# Patient Record
(demographics unavailable — no encounter records)

---

## 2024-10-10 NOTE — PROCEDURE
[FreeTextEntry3] : Trigger Point was performed because of pain inflammation Anesthesia: ethyl chloride sprayed topically.: Lidocaine .1% 3 cc Marcaine:.25% 3cc  kenalog 10mg/cc 2cc :Needle size: 25 gauge 1 1/2inch.  Medication was injected in the left Lumbar paraspinal muscles . Patient has tried OTC's including aspirin, Ibuprofen, Aleve etc or prescription NSAIDS, and/or exercises at home and/ or physical therapy without satisfactory response After verbal consent using sterile preparation and technique.The risks, benefits, and alternatives to cortisone injection were explained in full to the patient. Risks outlined include but are not limited to infection, sepsis, bleeding, scarring, skin discoloration, temporary increase in pain, syncopal episode, failure to resolve symptoms, allergic reaction, symptom recurrence, and elevation of blood sugar in diabetics. Patient understood the risks. All questions were answered. After discussion of options, patient requested an injection. Oral informed consent was obtained and sterile prep was done of the injection site. Sterile technique was utilized for the procedure including the preparation of the solutions used for the injection. Patient tolerated the procedure well. Advised to ice the injection site this evening.  Sterile technique used Prep with alcohol locally to site.

## 2024-10-10 NOTE — PHYSICAL EXAM
[de-identified] : PHYSICAL EXAM  Constitutional:  Appears well, no apparent distress Ability to communicate: Normal Respiratory: non-labored breathing Skin: no rash noted Head: normocephalic, atraumatic Neck: no visible thyroid enlargement Eyes: extraocular movements intact Neurologic: alert and oriented x3 Psychiatric: normal mood, affect, and behavior  Lumbar Spine:  Palpation: left lumbar paraspinal spasm and left lumbar paraspinal tenderness to palpation. ROM: Diminished range of motion in all plains.  Patient notes pain with lateral bending to the left. MMT: Motor exam is 5/5 through out bilateral lower extremities. Sensation: Light touch and pain is intact throughout bilateral lower extremities. Reflexes: achilles and patella reflexes are intact and  symmetrical.  No sustained clonus. Special Testing: Positive kemps maneuver on the left side  Assessemnt: Lumbar spondylosis (m47.816) Myalgia (M79.10)  Plan: After discussing various treatment options with the patient including but not limited to oral medications, physical therapy, exercise modalities as well as interventional spinal injections, we have decided with the following plan: The patient is presenting with axial lumbar pain that has not responded to three months of conservative therapy including physical therapy or nsaid therapy. The pain is interfering with activities of daily living and functionality.  There is no radicular pain.  The pain is exacerbated by facet loading.  Positive kemps maneuver which is defined by pain reproduction with extension and rotation of the lumbar spine to the affected side.  The patient has not had a vertebral fusion at the levels of the proposed treatment.  There is no unexplained neurologic deficit.  There is no bleeding tendency, unstable medical condition, or systemic infection.  The injection is being performed to diagnose the facet joint as the source of the individuals pain.  The risks, benefits and alternatives of the proposed procedure were explained in detail with the patient.  The risks outlined include but are not limited to infection, bleeding, post dural puncture headache, nerve injury, a temporary increase in pain, failure to resolve symptoms, allergic reaction, symptom recurrence, and possible elevation of blood sugar.  All questions were answered to patient's satisfaction and he/she verbalized an understanding.  Follow up 1-2 weeks post injection foe re-evaluation.  Continue home exercises, stretching, activity modification, physical therapy, and conservative care.

## 2024-10-10 NOTE — HISTORY OF PRESENT ILLNESS
[Lower back] : lower back [Dull/Aching] : dull/aching [Radiating] : radiating [Intermittent] : intermittent [Household chores] : household chores [Leisure] : leisure [Work] : work [Social interactions] : social interactions [Ice] : ice [Injection therapy] : injection therapy [Sitting] : sitting [Standing] : standing [Walking] : walking [Bending forward] : bending forward [Lying in bed] : lying in bed [Part time] : Work status: part time [7] : 7 [6] : 6 [FreeTextEntry1] : LT L2-L5 MBB- 09/27/2024 LT L2-L4 MBB- 8/30/2024 L5-S1 LESI- 7/31/2024 [] : no [FreeTextEntry7] : left side , right hip when pushing  [FreeTextEntry9] : lidocaine patches , cbd cream

## 2024-10-10 NOTE — PHYSICAL EXAM
[de-identified] : PHYSICAL EXAM  Constitutional:  Appears well, no apparent distress Ability to communicate: Normal Respiratory: non-labored breathing Skin: no rash noted Head: normocephalic, atraumatic Neck: no visible thyroid enlargement Eyes: extraocular movements intact Neurologic: alert and oriented x3 Psychiatric: normal mood, affect, and behavior  Lumbar Spine:  Palpation: left lumbar paraspinal spasm and left lumbar paraspinal tenderness to palpation. ROM: Diminished range of motion in all plains.  Patient notes pain with lateral bending to the left. MMT: Motor exam is 5/5 through out bilateral lower extremities. Sensation: Light touch and pain is intact throughout bilateral lower extremities. Reflexes: achilles and patella reflexes are intact and  symmetrical.  No sustained clonus. Special Testing: Positive kemps maneuver on the left side  Assessemnt: Lumbar spondylosis (m47.816) Myalgia (M79.10)  Plan: After discussing various treatment options with the patient including but not limited to oral medications, physical therapy, exercise modalities as well as interventional spinal injections, we have decided with the following plan: The patient is presenting with axial lumbar pain that has not responded to three months of conservative therapy including physical therapy or nsaid therapy. The pain is interfering with activities of daily living and functionality.  There is no radicular pain.  The pain is exacerbated by facet loading.  Positive kemps maneuver which is defined by pain reproduction with extension and rotation of the lumbar spine to the affected side.  The patient has not had a vertebral fusion at the levels of the proposed treatment.  There is no unexplained neurologic deficit.  There is no bleeding tendency, unstable medical condition, or systemic infection.  The injection is being performed to diagnose the facet joint as the source of the individuals pain.  The risks, benefits and alternatives of the proposed procedure were explained in detail with the patient.  The risks outlined include but are not limited to infection, bleeding, post dural puncture headache, nerve injury, a temporary increase in pain, failure to resolve symptoms, allergic reaction, symptom recurrence, and possible elevation of blood sugar.  All questions were answered to patient's satisfaction and he/she verbalized an understanding.  Follow up 1-2 weeks post injection foe re-evaluation.  Continue home exercises, stretching, activity modification, physical therapy, and conservative care.
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
## 2024-10-16 NOTE — SOCIAL HISTORY
[FreeTextEntry1] : Patient (pt) met developmental milestones WNL and denied academic difficulties or impairments. Pt lives at home with her  and 32-year-old son. Pt has a daughter who is 36-years-old and recently , Pt has one grandson whom she cares for a few days per week. 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 Pt shared having a supportive family.

## 2024-10-16 NOTE — HISTORY OF PRESENT ILLNESS
[FreeTextEntry1] : See presenting problem. [FreeTextEntry2] : Pt was in treatment with [facility] Psychologist Paul Delgadillo for 1.5 years. Treatment was terminated in August 2024 due to therapist retiring. Pt shared that therapy was very helpful in the past and her response was positive. Pt denied previous therapy or inpatient treatment. [FreeTextEntry3] : Pt currently sees a PCP and psychiatrist and has been taking Prozac 30mg since June 2024 and notices improvement in mood and a decrease in anxiety. Pt takes Xanax as needed.

## 2024-10-16 NOTE — PLAN
[Cognitive and/or Behavior Therapy] : Cognitive and/or Behavior Therapy  [Psychoeducation] : Psychoeducation  [de-identified] : Patient (pt) was calm and cooperative throughout. Session time was spent discussing pt ability to implement coping skills for effective communication with her family members, and ways in which the relationships have been improved. Pt making progress towards goals as expected and is responding positively to treatment. Pt and provider discussed decreasing sessions to biweekly and hanh for 4 more sessions, and then terminating treatment due to no impairment or distress. Pt in agreement with plan and believes that treatment will no longer be indicated at that time. Provider will continue to assess. Pt will continue to use communication and boundary setting related coping skills for home practice. [Return in ____ week(s)] : Return in [unfilled] week(s) [Every ___ week(s)] : Psychotherapy: Every [unfilled] week(s) [FreeTextEntry4] : Problem 1: reduce symptoms of anxiety and depression due to adjustment. Goal 1: reduce symptoms of anxiety and depression by 20% demonstrated by KEISHA-7 and PHQ-9. Goal 2: learn and implement 1-2 coping skills to improve anxiety and depression that patient can provide examples of in session.

## 2024-10-16 NOTE — RISK ASSESSMENT
[Clinical Interview] : Clinical Interview [No] : No [No known suicide factors] : No known suicide factors [Chronic pain/other acute medical condition] : chronic pain or other acute medical condition [Identifies reasons for living] : identifies reasons for living [Supportive social network of family or friends] : supportive social network of family or friends [Nondenominational beliefs] : Islam beliefs [Cultural, spiritual and/or moral attitudes against suicide] : cultural, spiritual and/or moral attitudes against suicide [Responsibility to children, family, or others] : responsibility to children, family, or others [Engaged in work or school] : engaged in work or school [None in the patient's lifetime] : None in the patient's lifetime [No known risk factors] : No known risk factors [Residential stability] : residential stability [Engagement in treatment] : engagement in treatment [Good treatment response/compliance] : good treatment response/compliance

## 2024-10-16 NOTE — PSYCHOSOCIAL ASSESSMENT
[All substances negative except as specified below] : All substances negative except as specified below [_____] : Last Use: [unfilled]  [FreeTextEntry1] : Pt shared that she is Yarsanism Buddhism. [No] : Have you ever experienced this type of event? No [Competitive and integrated employment] : Competitive and integrated employment

## 2024-10-29 NOTE — HISTORY OF PRESENT ILLNESS
[FreeTextEntry1] : rash  [de-identified] :  New patient, here for evaluation of rash on legs  asymptomatic  also notices that skin on soles of feet get dry and rough not very symptomatic  no new or changing skin lesions, no itching/bleeding/painful skin lesions

## 2024-10-29 NOTE — ASSESSMENT
[FreeTextEntry1] : 1. Purpura, lower legs - macular reassurance possibly traumatic counseled on dry skin care and emollients  2. Scaly patches on feet - favor inflammatory (psoriasis) vs possible keratoderma climactericum chronic, flaring fungal cx to exclude. discussed can take up to 4 weeks to result, but can give preliminary results in 2 weeks TAC 0.1% ointment BID  emollients will followup on how pt doing when share fungal cx results consider addition of keratolytic  3. Benign appearing nevi Cherry angiomas Seborrheic keratoses - benign, reassurance, no intervention needed unless irritated   - TBSE performed today - no concerning findings - TBSE every year with dermatologist - Photoprotection reviewed including sun-protective behaviors, protective clothing, and the use of OTC broad-spectrum SPF 30+ sunscreens was advised - RTC if develops lesions that are new, symptomatic (bleeding/itching), changing in size/color/shape particularly in areas of prior sun exposure (face, hands, chest)

## 2024-10-29 NOTE — PHYSICAL EXAM
[FreeTextEntry3] : AAOx3, NAD, well-appearing / pleasant Total body skin exam performed with the exception of:  limited genital/groin evaluation, limited scalp evaluation All examined areas within normal limits with the exception of linear purpura on legs scaling and erythema on soles of feet

## 2024-11-04 NOTE — PROCEDURE
[FreeTextEntry3] : Date of Service: 11/04/2024   Account: [id number]   Patient: MARTIN REAGAN   YOB: 1959   Age: 65 year     Surgeon:  Ruben Estrada M.D.   PREOPERATIVE DIAGNOSIS: Spondylosis of lumbar region without myelopathy or radiculopathy   POSTOPERATIVE DIAGNOSIS: Spondylosis of lumbar region without myelopathy or radiculopathy   PROCEDURE:        1) Left sided L2, L3, L4, L5 medial branch radiofrequency ablation under fluoroscopic guidance.   Anesthesia:                           MAC     Risks, benefits and alternatives of the procedure were discussed with the patient after which she agreed to proceed.  Patient was brought into fluoroscopy suite and was placed in prone position with hip support. Back was prepped and draped in a sterile fashion x3. Anesthesia initiated.   Under AP visualization, the left sacral ala was identified and marked. Using a 25 gauge  inch needle the skin and subcutaneous structures at this point were localized with 1% Lidocaine using approximately 3 cc's 1% Lidocaine.  After this, a 20 gauge 100mm Nazario radiofrequency needle with a 10mm curved tip was inserted and using depth direction depth technique under constant fluoroscopic visualization, the needle was advanced to the sacral ala until os was contacted.   The camera was then redirected under AP view to visualize the L3,L4,L5 vertebra, the camera was left oblique to approximately 30 degrees to reveal good Kevin dog anatomical view. The junction of the superior articulate process and transverse process at the L3,L4,L5 levels on the left were then identified and marked. Skin and subcutaneous structures were then anesthetized with approximately 3 cc's of 1% Lidocaine at each of these levels. After which a 20 gauge 100mm Nazario radiofrequency needle with a 10mm curved tip then advanced until the junction at the SAP and transverse process was met.  The camera was then directed through the lateral view and under constant fluoroscopic visualization, the needle tips were then advanced and confirmed at the junction of the SAP and transverse process.   The stylette for the most cephalad needle at the L3 level was then removed and a Nazario radiofrequency probe was then placed inside the needle. After her pedis' were checked at approximately 300 ohm, 50 hertz sensory stimulation was performed.  Patient experienced concordant pain in his left low back at approximately 0.3 volts. The voltage was then increased to 1 volt. The patient reported increased left low back pain symptoms without any radiation below the left knee.  Stimulation was then changed to 2 hertz and increased slowly by .1 volt increments at approximately 0.5 volts, patient began to experience thumping like reproductive pain in his left low back. The voltage was then increased to approximately 2.5 volts. Patient experienced increasing thumping without any sensation below his left knee. This exact stimulation was then repeated for the L4 and L5 needles as well as sacral ala needle with concordant pain and no radiation below the left knee. The 3 levels were then anesthetized with approximately 0.5 cc's of 1% Lidocaine. After which each area was then ablated at 80 degrees centigrade for 60 seconds each. Patient felt no pain reproduction during the ablation procedure.  After each of these levels were ablated and injected approximately 1 cc of 0.25% Bupivacaine plus 10 mg of kenalog were then injected before the needles were removed.  Pressure was then applied to the low back. Band-aids were applied.  Patient was brought to the recovery, ambulated on his own after the procedure and reported decreased left sided low back pain.   Anesthesia personnel were present throughout the procedure. Patient was told to apply ice to the low back for 20 minutes on and 20 minutes off for focal symptoms for 24-48 hours. He is to call the office if he had any questions or concerns.     Ruben Estrada M.D.

## 2024-11-05 NOTE — ASSESSMENT
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 - There have not been any reported side effects.   Lifestyle: - Nutrition: adequate - Restorative sleep: 6-7hrs/night - Physical activity: sedentary - Stress management: elevated stress - Avoidance of risky substances: drinks socially - Smoking: non-smoker   Assessment/Plan: The patient is very close to the BP target goal of 130/80 at this time. Will continue ongoing education on lifestyle modifications such as diet, hydration, exercise, sleep, and stress management.   See the "[facility] Clinical Summary Report" for a detailed summary of RPM care provided and time spent in Oct 2024, and the "[facility] 30-Day Vital Signs Report" for vital sign transmissions and trends.

## 2024-11-06 NOTE — PLAN
[Cognitive and/or Behavior Therapy] : Cognitive and/or Behavior Therapy  [Psychoeducation] : Psychoeducation  [de-identified] : Patient (pt) was calm and cooperative throughout. Session time was spent discussing pt progress related to asserting her needs, setting boundaries and engaging in effective communication. Pt shared that her family system has been cooperating and everyone has been respectful, making the living environment comfortable and calm. Psychoeducation on ACT dropping anchor skill was provided and an experiential exercise was completed with perceived benefit. Pt making progress towards goals as expected and is responding positively to treatment. Pt will continue to use communication and boundary setting related coping skills for home practice. [Return in ____ week(s)] : Return in [unfilled] week(s) [Every ___ week(s)] : Psychotherapy: Every [unfilled] week(s) [FreeTextEntry4] : Problem 1: reduce symptoms of anxiety and depression due to adjustment. Goal 1: reduce symptoms of anxiety and depression by 20% demonstrated by KEISHA-7 and PHQ-9. Goal 2: learn and implement 1-2 coping skills to improve anxiety and depression that patient can provide examples of in session.

## 2024-11-06 NOTE — SOCIAL HISTORY
[FreeTextEntry1] : Patient (pt) met developmental milestones WNL and denied academic difficulties or impairments. Pt lives at home with her  and 32-year-old son. Pt has a daughter who is 36-years-old and recently , Pt has one grandson whom she cares for a few days per week. 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 Pt shared having a supportive family.

## 2024-11-06 NOTE — HISTORY OF PRESENT ILLNESS
[FreeTextEntry1] : See presenting problem. [FreeTextEntry2] : Pt was in treatment with [facility] Psychologist Paul Delgadillo for 1.5 years. Treatment was terminated in August 2024 due to therapist retiring. Pt shared that therapy was very helpful in the past and her response was positive. Pt denied previous therapy or inpatient treatment. [FreeTextEntry3] : Pt currently sees a PCP and psychiatrist and has been taking Prozac 30mg since June 2024 and notices improvement in mood and a decrease in anxiety. Pt takes Xanax as needed.

## 2024-11-06 NOTE — RISK ASSESSMENT
[Clinical Interview] : Clinical Interview [No] : No [No known suicide factors] : No known suicide factors [Chronic pain/other acute medical condition] : chronic pain or other acute medical condition [Identifies reasons for living] : identifies reasons for living [Supportive social network of family or friends] : supportive social network of family or friends [Episcopal beliefs] : Moravian beliefs [Cultural, spiritual and/or moral attitudes against suicide] : cultural, spiritual and/or moral attitudes against suicide [Responsibility to children, family, or others] : responsibility to children, family, or others [Engaged in work or school] : engaged in work or school [None in the patient's lifetime] : None in the patient's lifetime [No known risk factors] : No known risk factors [Residential stability] : residential stability [Engagement in treatment] : engagement in treatment [Good treatment response/compliance] : good treatment response/compliance

## 2024-11-18 NOTE — PHYSICAL EXAM
[de-identified] : PHYSICAL EXAM  Constitutional:  Appears well, no apparent distress Ability to communicate: Normal Respiratory: non-labored breathing Skin: no rash noted Head: normocephalic, atraumatic Neck: no visible thyroid enlargement Eyes: extraocular movements intact Neurologic: alert and oriented x3 Psychiatric: normal mood, affect, and behavior  Lumbar Spine:  Palpation: left lumbar paraspinal spasm and left lumbar paraspinal tenderness to palpation. ROM: Diminished range of motion in all plains.  Patient notes pain with lateral bending to the left. MMT: Motor exam is 5/5 through out bilateral lower extremities. Sensation: Light touch and pain is intact throughout bilateral lower extremities. Reflexes: achilles and patella reflexes are intact and  symmetrical.  No sustained clonus. Special Testing: Positive kemps maneuver on the left side

## 2024-11-18 NOTE — DISCUSSION/SUMMARY
[de-identified] : sp rfa.  pateint had a fall and is still sore in the lumbar region . no radicular pain or neurologic symptoms  will follow up in 4 weeks to re assess

## 2024-11-18 NOTE — HISTORY OF PRESENT ILLNESS
[Lower back] : lower back [7] : 7 [Dull/Aching] : dull/aching [Radiating] : radiating [Intermittent] : intermittent [Household chores] : household chores [Leisure] : leisure [Work] : work [Social interactions] : social interactions [Ice] : ice [Injection therapy] : injection therapy [Sitting] : sitting [Standing] : standing [Walking] : walking [Bending forward] : bending forward [Lying in bed] : lying in bed [Part time] : Work status: part time [FreeTextEntry1] : LT L2-L5 RFA-11/04/2024- pt states she fell last week on thursday and she is having pain  LT L2-L5 MBB- 09/27/2024 LT L2-L4 MBB- 8/30/2024 L5-S1 LESI- 7/31/2024 [] : no [FreeTextEntry7] : left side , right hip when pushing  [FreeTextEntry9] : lidocaine patches , cbd cream
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
## 2024-11-20 NOTE — SOCIAL HISTORY
[FreeTextEntry1] : Patient (pt) met developmental milestones WNL and denied academic difficulties or impairments. Pt lives at home with her  and 32-year-old son. Pt has a daughter who is 36-years-old and recently , Pt has one grandson whom she cares for a few days per week. 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 Pt shared having a supportive family.

## 2024-11-20 NOTE — RISK ASSESSMENT
[Clinical Interview] : Clinical Interview [No] : No [No known suicide factors] : No known suicide factors [Chronic pain/other acute medical condition] : chronic pain or other acute medical condition [Identifies reasons for living] : identifies reasons for living [Supportive social network of family or friends] : supportive social network of family or friends [Islam beliefs] : Mandaeism beliefs [Cultural, spiritual and/or moral attitudes against suicide] : cultural, spiritual and/or moral attitudes against suicide [Responsibility to children, family, or others] : responsibility to children, family, or others [Engaged in work or school] : engaged in work or school [None in the patient's lifetime] : None in the patient's lifetime [No known risk factors] : No known risk factors [Residential stability] : residential stability [Engagement in treatment] : engagement in treatment [Good treatment response/compliance] : good treatment response/compliance

## 2024-11-20 NOTE — HISTORY OF PRESENT ILLNESS
[FreeTextEntry1] : See presenting problem. [FreeTextEntry2] : Pt was in treatment with [facility] Psychologist Paul Delgadillo for 1.5 years. Treatment was terminated in August 2024 due to therapist retiring. Pt shared that therapy was very helpful in the past and her response was positive. Pt denied previous therapy or inpatient treatment. [FreeTextEntry3] : Pt currently sees a PCP and psychiatrist and has been taking Prozac 30mg since June 2024 and notices improvement in mood and a decrease in anxiety. Pt takes Xanax as needed.

## 2024-11-20 NOTE — PSYCHOSOCIAL ASSESSMENT
[All substances negative except as specified below] : All substances negative except as specified below [_____] : Last Use: [unfilled]  [FreeTextEntry1] : Pt shared that she is Yazidi Taoism. [No] : Have you ever experienced this type of event? No [Competitive and integrated employment] : Competitive and integrated employment

## 2024-11-20 NOTE — PLAN
[Cognitive and/or Behavior Therapy] : Cognitive and/or Behavior Therapy  [Psychoeducation] : Psychoeducation  [de-identified] : Patient (pt) was calm and cooperative throughout. Session time was spent discussing pt progress made and goals met. Pt is able to regularly implement coping skills. Pt shared having an increase in chronic back pain and related challenges. Psychoeducation on ACT cognitive defusion skill was provided and an experiential exercise was completed with perceived benefit. Pt making progress towards goals as expected and is responding positively to treatment. Pt will continue to use communication and boundary setting related coping skills for home practice. [Return in ____ week(s)] : Return in [unfilled] week(s) [Every ___ week(s)] : Psychotherapy: Every [unfilled] week(s) [FreeTextEntry4] : Problem 1: reduce symptoms of anxiety and depression due to adjustment. Goal 1: reduce symptoms of anxiety and depression by 20% demonstrated by KEISHA-7 and PHQ-9. Goal 2: learn and implement 1-2 coping skills to improve anxiety and depression that patient can provide examples of in session.

## 2024-11-27 NOTE — PHYSICAL EXAM
[de-identified] : PHYSICAL EXAM  Constitutional:  Appears well, no apparent distress Ability to communicate: Normal Respiratory: non-labored breathing Skin: no rash noted Head: normocephalic, atraumatic Neck: no visible thyroid enlargement Eyes: extraocular movements intact Neurologic: alert and oriented x3 Psychiatric: normal mood, affect, and behavior  Lumbar Spine:  Palpation: left lumbar paraspinal spasm and left lumbar paraspinal tenderness to palpation. ROM: Diminished range of motion in all plains.  Patient notes pain with lateral bending to the left. MMT: Motor exam is 5/5 through out bilateral lower extremities. Sensation: Light touch and pain is intact throughout bilateral lower extremities. Reflexes: achilles and patella reflexes are intact and  symmetrical.  No sustained clonus. Special Testing: Positive kemps maneuver on the left side  Assessemnt: Lumbar spondylosis (m47.816) Myalgia (M79.10)  Plan: After discussing various treatment options with the patient including but not limited to oral medications, physical therapy, exercise modalities as well as interventional spinal injections, we have decided with the following plan: The patient is presenting with axial lumbar pain that has not responded to three months of conservative therapy including physical therapy or nsaid therapy. The pain is interfering with activities of daily living and functionality.  There is no radicular pain.  The pain is exacerbated by facet loading.  Positive kemps maneuver which is defined by pain reproduction with extension and rotation of the lumbar spine to the affected side.  The patient has not had a vertebral fusion at the levels of the proposed treatment.  There is no unexplained neurologic deficit.  There is no bleeding tendency, unstable medical condition, or systemic infection.  The injection is being performed to diagnose the facet joint as the source of the individuals pain.  The risks, benefits and alternatives of the proposed procedure were explained in detail with the patient.  The risks outlined include but are not limited to infection, bleeding, post dural puncture headache, nerve injury, a temporary increase in pain, failure to resolve symptoms, allergic reaction, symptom recurrence, and possible elevation of blood sugar.  All questions were answered to patient's satisfaction and he/she verbalized an understanding.  Follow up 1-2 weeks post injection foe re-evaluation.  Continue home exercises, stretching, activity modification, physical therapy, and conservative care.

## 2024-11-27 NOTE — HISTORY OF PRESENT ILLNESS
[Lower back] : lower back [Dull/Aching] : dull/aching [Radiating] : radiating [Intermittent] : intermittent [Household chores] : household chores [Leisure] : leisure [Work] : work [Social interactions] : social interactions [Ice] : ice [Injection therapy] : injection therapy [Sitting] : sitting [Standing] : standing [Walking] : walking [Bending forward] : bending forward [Lying in bed] : lying in bed [Part time] : Work status: part time [Head] : head [Neck] : neck [8] : 8 [Work related] : work related [] : no [FreeTextEntry1] : both shoulder  [FreeTextEntry3] : 11/14/2024 [FreeTextEntry7] : left side , right hip when pushing  [FreeTextEntry9] : lidocaine patches , cbd cream

## 2024-11-27 NOTE — DISCUSSION/SUMMARY
[de-identified] : SP FALL WITH EXACERBATION OF BACK PAIN AND SUBJECTIVE WEAKNESS IN BL LE  WILL PROCEED WITH MRI LS SPINE  TPI L LS

## 2024-12-04 NOTE — DISCUSSION/SUMMARY
Pt will call her insurance and give us a call back.    [FreeTextEntry1] : Pt is estimated low chronic and acute risk.

## 2024-12-04 NOTE — RISK ASSESSMENT
[Clinical Interview] : Clinical Interview [No] : No [No known suicide factors] : No known suicide factors [Chronic pain/other acute medical condition] : chronic pain or other acute medical condition [Identifies reasons for living] : identifies reasons for living [Supportive social network of family or friends] : supportive social network of family or friends [Scientologist beliefs] : Samaritan beliefs [Cultural, spiritual and/or moral attitudes against suicide] : cultural, spiritual and/or moral attitudes against suicide [Responsibility to children, family, or others] : responsibility to children, family, or others [Engaged in work or school] : engaged in work or school [None in the patient's lifetime] : None in the patient's lifetime [No known risk factors] : No known risk factors [Residential stability] : residential stability [Engagement in treatment] : engagement in treatment [Good treatment response/compliance] : good treatment response/compliance

## 2024-12-04 NOTE — PLAN
[Cognitive and/or Behavior Therapy] : Cognitive and/or Behavior Therapy  [Psychoeducation] : Psychoeducation  [de-identified] : Patient (pt) was calm and cooperative throughout. Session time was spent discussing distressing events related to family dysfunction on Thanksgiving, and pt stress related to going on a cruise. Family dynamics and boundaries were explored. Pt is not looking forward to cruise due to potential sea sickness and negative beliefs about her body. Emotions and thoughts were processed, skills were applied. Pt making progress towards goals as expected and is responding positively to treatment. Pt will continue to use communication and boundary setting related coping skills for home practice. [Return in ____ week(s)] : Return in [unfilled] week(s) [Every ___ week(s)] : Psychotherapy: Every [unfilled] week(s) [FreeTextEntry4] : Problem 1: reduce symptoms of anxiety and depression due to adjustment. Goal 1: reduce symptoms of anxiety and depression by 20% demonstrated by KEISHA-7 and PHQ-9. Goal 2: learn and implement 1-2 coping skills to improve anxiety and depression that patient can provide examples of in session.
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
## 2024-12-04 NOTE — SOCIAL HISTORY
[FreeTextEntry1] : Patient (pt) met developmental milestones WNL and denied academic difficulties or impairments. Pt lives at home with her  and 32-year-old son. Pt has a daughter who is 36-years-old and recently , Pt has one grandson whom she cares for a few days per week. 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 Pt shared having a supportive family.

## 2024-12-04 NOTE — PSYCHOSOCIAL ASSESSMENT
[All substances negative except as specified below] : All substances negative except as specified below [_____] : Last Use: [unfilled]  [FreeTextEntry1] : Pt shared that she is Yazdanism Bahai. [No] : Have you ever experienced this type of event? No [Competitive and integrated employment] : Competitive and integrated employment

## 2024-12-04 NOTE — PROCEDURE
[Therapeutic Injection] : therapeutic injection [Right] : of the right [Other: ____] : [unfilled] [Alcohol] : alcohol [___ cc    30mg] : Ketorolac (Toradol) ~Vcc of 30 mg  [Call if redness, pain or fever occur] : call if redness, pain or fever occur [Risks, benefits, alternatives discussed / Verbal consent obtained] : the risks benefits, and alternatives have been discussed, and verbal consent was obtained

## 2024-12-04 NOTE — HISTORY OF PRESENT ILLNESS
[Head] : head [Neck] : neck [Lower back] : lower back [Work related] : work related [8] : 8 [Dull/Aching] : dull/aching [Radiating] : radiating [Intermittent] : intermittent [Household chores] : household chores [Leisure] : leisure [Work] : work [Social interactions] : social interactions [Ice] : ice [Injection therapy] : injection therapy [Sitting] : sitting [Standing] : standing [Walking] : walking [Bending forward] : bending forward [Lying in bed] : lying in bed [Part time] : Work status: part time [] : no [FreeTextEntry7] : left side , right hip when pushing  [FreeTextEntry9] : lidocaine patches , cbd cream  [FreeTextEntry1] : See presenting problem. [FreeTextEntry2] : Pt was in treatment with [facility] Psychologist Paul Delgadillo for 1.5 years. Treatment was terminated in August 2024 due to therapist retiring. Pt shared that therapy was very helpful in the past and her response was positive. Pt denied previous therapy or inpatient treatment. [FreeTextEntry3] : Pt currently sees a PCP and psychiatrist and has been taking Prozac 30mg since June 2024 and notices improvement in mood and a decrease in anxiety. Pt takes Xanax as needed.

## 2024-12-05 NOTE — PHYSICAL EXAM
[de-identified] : PHYSICAL EXAM  Constitutional:  Appears well, no apparent distress Ability to communicate: Normal Respiratory: non-labored breathing Skin: no rash noted Head: normocephalic, atraumatic Neck: no visible thyroid enlargement Eyes: extraocular movements intact Neurologic: alert and oriented x3 Psychiatric: normal mood, affect, and behavior  Lumbar Spine:  Palpation: left lumbar paraspinal spasm and left lumbar paraspinal tenderness to palpation. ROM: Diminished range of motion in all plains.  Patient notes pain with lateral bending to the left. MMT: Motor exam is 5/5 through out bilateral lower extremities. Sensation: Light touch and pain is intact throughout bilateral lower extremities. Reflexes: achilles and patella reflexes are intact and  symmetrical.  No sustained clonus. Special Testing: Positive kemps maneuver on the left side

## 2024-12-05 NOTE — PHYSICAL EXAM
[de-identified] : PHYSICAL EXAM  Constitutional:  Appears well, no apparent distress Ability to communicate: Normal Respiratory: non-labored breathing Skin: no rash noted Head: normocephalic, atraumatic Neck: no visible thyroid enlargement Eyes: extraocular movements intact Neurologic: alert and oriented x3 Psychiatric: normal mood, affect, and behavior  Lumbar Spine:  Palpation: left lumbar paraspinal spasm and left lumbar paraspinal tenderness to palpation. ROM: Diminished range of motion in all plains.  Patient notes pain with lateral bending to the left. MMT: Motor exam is 5/5 through out bilateral lower extremities. Sensation: Light touch and pain is intact throughout bilateral lower extremities. Reflexes: achilles and patella reflexes are intact and  symmetrical.  No sustained clonus. Special Testing: Positive kemps maneuver on the left side

## 2024-12-05 NOTE — HISTORY OF PRESENT ILLNESS
[Head] : head [Neck] : neck [Lower back] : lower back [Work related] : work related [8] : 8 [Dull/Aching] : dull/aching [Radiating] : radiating [Intermittent] : intermittent [Household chores] : household chores [Leisure] : leisure [Work] : work [Social interactions] : social interactions [Ice] : ice [Injection therapy] : injection therapy [Sitting] : sitting [Standing] : standing [Walking] : walking [Bending forward] : bending forward [Lying in bed] : lying in bed [Part time] : Work status: part time [] : no [FreeTextEntry1] : both shoulder  [FreeTextEntry3] : 11/14/2024 [FreeTextEntry7] : left side , right hip when pushing  [FreeTextEntry9] : lidocaine patches , cbd cream

## 2024-12-05 NOTE — PHYSICAL EXAM
[de-identified] : PHYSICAL EXAM  Constitutional:  Appears well, no apparent distress Ability to communicate: Normal Respiratory: non-labored breathing Skin: no rash noted Head: normocephalic, atraumatic Neck: no visible thyroid enlargement Eyes: extraocular movements intact Neurologic: alert and oriented x3 Psychiatric: normal mood, affect, and behavior  Lumbar Spine:  Palpation: left lumbar paraspinal spasm and left lumbar paraspinal tenderness to palpation. ROM: Diminished range of motion in all plains.  Patient notes pain with lateral bending to the left. MMT: Motor exam is 5/5 through out bilateral lower extremities. Sensation: Light touch and pain is intact throughout bilateral lower extremities. Reflexes: achilles and patella reflexes are intact and  symmetrical.  No sustained clonus. Special Testing: Positive kemps maneuver on the left side

## 2024-12-18 NOTE — REASON FOR VISIT
[Follow-Up] : a follow-up visit [TextBox_44] : SOB, asthma (mild persistent), allergies, GERD, likely SHAQUILLE

## 2024-12-18 NOTE — HISTORY OF PRESENT ILLNESS
[TextBox_4] : Ms. REAGAN is a 65-year-old female presenting to the office today for a follow-up pulmonary evaluation. Her chief complaint is  -she notes she follows up with Dr. Estrada. She's had epidural injections, 2 nerve blocks, and a nerve ablation. The nerve ablation improved her back pain 2 weeks later. She was doing well until she fell backward putting food in her refrigerator. She hit her back and her head, and she's been unwell since. -she notes she's fatigued and sluggish from L1, L2, L3, L4 pain -she's refusing surgery, though no one has spoken to her about it yet -she notes difficulty with daily activities of living (walking) due to her back pain -she notes dizziness -she notes GERD is controlled on Omeprazole -she notes snoring -she notes nocturia X0-1 -she notes bowels are regular  -she notes s/p cataract surgery in both eyes, which made her walking unsteady -she denies taking any new medications, vitamins, or supplements  -she notes she has blotches on her chest and legs from scratching herself. The dermatologist gave her cream, and the combination of that with CeraVe anti-itch cream helps her -she notes her son is doing great (TBI) -she notes her  had COVID-19 in the summer. She started having Sx, but never tested positive for it. She had persistent nausea, so she was evaluated and diagnosed with ulcerative colitis  -she denies any headaches, emesis, fever, chills, sweats, chest pain, chest pressure, coughing, wheezing, palpitations, diarrhea, constipation, dysphagia, myalgias, leg swelling, itchy eyes, itchy ears, or sour taste in the mouth.

## 2024-12-18 NOTE — PROCEDURE
[FreeTextEntry1] : PFTs revealed normal flows; FEV1 was 2.76L, which is 138% of predicted; normal flow volume loop. PFTs were performed to evaluate for asthma  FENO was 24; a normal value being less than 25 Fractional exhaled nitric oxide (FENO) is regarded as a simple, noninvasive method for assessing eosinophilic airway inflammation. Produced by a variety of cells within the lung, nitric oxide (NO) concentrations are generally low in healthy individuals. However, high concentrations of NO appear to be involved in nonspecific host defense mechanisms and chronic inflammatory diseases such as asthma. The American Thoracic Society (ATS) therefore has recommended using FENO to aid in the diagnosis and monitoring of eosinophilic airway inflammation and asthma, and for identifying steroid responsive individuals whose chronic respiratory symptoms may be caused by airway inflammation.

## 2024-12-18 NOTE — ASSESSMENT
[FreeTextEntry1] : Ms. REAGAN is a 64 year old female coming into the office today for an initial evaluation with a prior history of elevated cholesterol, palpitations, SIBO, prior pneumonia, overweight, migraine, IBS, hypertension, diverticulitis, COVID-19 4/2022, asthma, allergies, GERD- SOB, asthma (mild persistent), allergies, GERD, likely SHAQUILLE- spine issues/urticaria  The patient's SOB is felt to be multifactorial: - Overweight - Out of Shape - Poor breathing mechanics - Pulmonary  - Asthma (mild persistent) - Cardiac  Problem 1: Asthma (mild persistent)  - Alternate between Symbicort 80/160 2 inhalations BID -continue Spiriva at 2 inhalations Qday, Hold steroids - continue Singulair 10 mg before bed (QHS) - continue Albuterol QID - Asthma is believed to be caused by inherited (genetic) and environmental factor, but its exact cause is unknown. Asthma may be triggered by allergens, lung infections, or irritants in the air. Asthma triggers are different for each person  Problem 2: Allergies - continue Dymista 1 sniff/nostril BID -continue Xyzal 5 mg QHS -continue Clarinex 5 mg QAM -s/p blood work: asthma profile(-), food IgE panel(-), eosinophil level(-), IgE level(-), Vitamin D level (-) - Environmental measures for allergies were encouraged including mattress and pillow cover, air purifier, and environmental controls.  Problem 2A: Urticaria -check IgE levels, eosinophil levels 12/2024 -Dupixent is a prescription medicine used with other asthma medicines for the maintenance treatment of moderate-to-severe asthma in people aged 12 years and older whose asthma is not controlled with their current asthma medicines. Dupixent helps prevent severe asthma attacks (exacerbations) and can improve your breathing. Dupixent may also help reduce the amount of oral corticosteroids you need while preventing severe asthma attacks and improving your breathing. Dupixent is not used to treat sudden breathing problems. Risks and side effects of Dupixent were discussed and reviewed with patient.   Problem 3: GERD - continue Omeprazole 20 mg every morning (qAM) before breakfast - Things to avoid including overeating, spicy foods, tight clothing, eating within three hours of bed, this list is not all inclusive. - For treatment of reflux, possible options discussed including diet control, H2 blockers, PPIs, as well as coating motility agents discussed as treatment options. Timing of meals and proximity of last meal to sleep were discussed. If symptoms persist, a formal gastrointestinal evaluation is needed.  Problem 4: Likely SHAQUILLE (Risk factors: snoring, reflux)- NC - Get home sleep study- refusing - Sleep apnea is associated with adverse clinical consequences which an affect most organ systems. Cardiovascular disease risk includes arrhythmias, atrial fibrillation, hypertension, coronary artery disease, and stroke. Metabolic disorders include diabetes type 2, non-alcoholic fatty liver disease. Mood disorder especially depression; and cognitive decline especially in the elderly. Associations with chronic reflux/Goyal's esophagus some but not all inclusive. -Reasons include arousal consistent with hypopnea; respiratory events most prominent in REM sleep or supine position; therefore sleep staging and body position are important for accurate diagnosis and estimation of AHI.  Problem 5: Cardiac -Recommended cardiac follow-up evaluation with cardiologist  Problem 6: Overweight/out of shape - Weight loss, exercise, and diet control were discussed and are highly encouraged. Treatment options were given such as, aqua therapy, and contacting a nutritionist. Recommended to use the elliptical, stationary bike, less use of treadmill. Mindful eating was explained to the patient Obesity is associated with worsening asthma, shortness of breath, and potential for cardiac disease, diabetes, and other underlying medical conditions  Problem 7: Poor Breathing Mechanics - Recommended Tanisha Pretty and Butrosana breathing techniques - Proper breathing techniques were reviewed with an emphasis of exhalation. Patient instructed to breath in for 1 second and out for four seconds. Patient was encouraged to not talk while walking.  Problem 8: Health maintenance -recommended RSV vaccination 2024 -calf pain- recommended Mauricio Read Foundation Stretches on YouTube- recommended Topricin cream -recommended SPM and quercetin -Back Pain: PEMF mat; recommended Dr. Chin Avila/Scottie Oconnor  -s/p flu shot 2024 -recommended strep pneumonia vaccines: Prevnar-13 vaccine, followed by Pneumo vaccine 23 one year following -recommended early intervention for URIs -recommended regular osteoporosis evaluations-recommended early dermatological evaluations -recommended after the age of 50 to the age of 70, colonoscopy every 5 years  F/P in 3-6 months pt is encouraged to call or fax the office with any questions or concerns. Explained to the pt in full detail with demonstrations how to use the inhalers and inhaler hygiene. -Education provided to the PT regarding their visit and conditions.

## 2024-12-18 NOTE — ADDENDUM
[FreeTextEntry1] : Documented by Pauly Doran acting as a scribe for Dr. Karsten Cope on 12/18/2024. All medical record entries made by the Scribe were at my, Dr. Karsten Cope's, direction and personally dictated by me on 12/18/2024. I have reviewed the chart and agree that the record accurately reflects my personal performance of the history, physical exam, assessment and plan. I have also personally directed, reviewed, and agree with the discharge instructions.

## 2025-01-03 NOTE — ASSESSMENT
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 - There have not been any reported side effects.   Lifestyle: - Nutrition: adequate - Restorative sleep: 6-7hrs/night - Physical activity: sedentary - Stress management: elevated stress - Avoidance of risky substances: drinks socially - Smoking: non-smoker   Assessment/Plan: The patient is not at a target BP goal of 130/80. Patient to be discharged from RPM program and continue care through primary practice   See the "RPM Clinical Summary Report" for a detailed summary of RPM care provided and time spent in Dec 2024, and the "RPM 30-Day Vital Signs Report" for vital sign transmissions and trends.

## 2025-01-09 NOTE — PHYSICAL EXAM
[FreeTextEntry3] : - scaly annular red thin plaque on the R lateral foot - mild scale and erythema on both feet b/l   - several purpuric macules and patches on the b/l lower extremities and arms, b/l lower extremity edema  - crusted open wound on the left knee

## 2025-01-09 NOTE — ASSESSMENT
[FreeTextEntry1] : 1. Purpura, lower legs - macular reassurance favor traumatic counseled on dry skin care and emollients  2. Tinea pedis  chronic, flaring fungal cx grew rare trychophyton  - C/w ketoconazole 2% cream BID  - Can stop TAC 0.1% ointment   - Can c/w Cerave anti-itch cream   3. Open wound on the left knee 2/2 traumatic fall  - No clinical signs of infection but advised pt OK to finish out keflex course prescribed by UC  - Can use mupirocin 2% ointment BID when open/raw  - Wound care discussed   RTC PRN

## 2025-01-09 NOTE — HISTORY OF PRESENT ILLNESS
[FreeTextEntry1] : rash  [de-identified] :  MARTIN REAGAN is a 65 year old female who presents for eval of:  1. evaluation of rash on feet, itchy, no improvement with TAC 0.1%. fungal cx grew rare trichophyton, prescribed keto cream a few days ago but pt still using TAC  2. wound on the left leg that has not healed, has been one month since she fell. went to  and prescribed keflex course and mupirocin. has a few days left  3. purpura on the legs and arms

## 2025-01-09 NOTE — HISTORY OF PRESENT ILLNESS
[FreeTextEntry1] : rash  [de-identified] :  MARTIN REAGAN is a 65 year old female who presents for eval of:  1. evaluation of rash on feet, itchy, no improvement with TAC 0.1%. fungal cx grew rare trichophyton, prescribed keto cream a few days ago but pt still using TAC  2. wound on the left leg that has not healed, has been one month since she fell. went to  and prescribed keflex course and mupirocin. has a few days left  3. purpura on the legs and arms

## 2025-02-03 NOTE — ASSESSMENT
[FreeTextEntry1] : 65 F w/ PMHx of white coat w/ HTN, GERD, recurrent SIBO, caregiver stress, asthma, IBS, allergies, vertigo/tinnitis, migraines, lumbosacral radiculitis and sleeping issues here for follow up. #1 HTN: Controlled with lisinopril 30mg daily (renewed). Did remote monitoring. Risks of uncontrolled hypertension explained including but not limited to MI/TIA/CVA/PVD/PAD/CHF/retinopathy/nephropathy/vascular problems and ultimately death. Recommendations include: Low sodium diet (2 grams/24 hours), limit caffeine to 1 cup per day, Maintain a healthy weight, exercise at least 30 minutes 3x per week, Medication compliance and Annual EKG/2D Echo/cardiac evaluation. Discussed BW done today resulted on HIE tab.  #2 Obesity: Discussed the risks of obesity including but not limited to contributing to the development of hypertension, diabetes, hyperlipidemia, circulation problems and their subsequent complications. The complications of these medical conditions include MI/TIA/CVA/PAD/PVD/retinopathy/nephropathy/neuropathy/amputation and even death in severe cases. Discussed different dietary techniques including atkins, [place], weight watchers and osito samy. The patient was encouraged to decrease their caloric intake and increase their exercise. They should exercise atleast 30 minutes 3x per week to bring the BMI to less than 26.  #3 GERD: GERD diet and precautions. On PPI. UTD GI.  #4 vertigo/tinnitis: vertigo is asymptomatic but tinnitis in right ear is continuous..  #5 Asthma: Sees Dr Cope. Well controlled on meds.  #6 Migraines: Sees neuro Dr Rosas. See if botox would help.Aimovig is not helping enough.  #7 caregiver stress: The patient has a high stress level. Discussed different techniques to alleviate stress without medication at length. Techniques that were recommended were deep breathing, yoga, aerobic exercise, prayer and meditation. The patient was advised to maintain a healthy, well-balanced diet and sleep at least 7-8 hours per night. Sees psych Dr Cam. Increased fluoxetine to 10mg TID.  Still seeing Dr Delgadillo weekly.  #8 Sleeping issues: Sleeping well with supplements including CBD gummies. Going to try to stop xanax for sleeping.  #9 Lumbar radiculitis: Hx of herniated discs. The patient has low back pain and tried epidural injections with no relief. Discussed good back health. The patient should avoid heavy lifting. They should sleep in the supine position and make sure to stretch their hamstrings. Proper stretching techniques were explained. They should use warm compresses, stretching, massage and take over the counter anti-inflammatories as needed. If they develop any lower extremity weakness, saddle anesthesia or fecal/urinary incontinence they should go to the ER immediately. Seeing pain mgmt and getting epidurals. Has muscle relaxors and anti-inflammatory (meloxicam) and lidocaine patches. #10 Nausea: Multifactorial--severe back pain, coliotis/GERD and migraines. Has ondansetron 4 mg PRN. #11 dysthymia: Seeing psych and on higher dose of fluoxetine. The patient denies any homicidal or suicidal ideations. She is able to perform her daily ADLs. Sees therapist/psych. #12 Colitis/gallstones: Seeing GI. Consider lap january in future. Not on abx.  #13 Cyclothymia: Psych referral given #14 HCM: All of her questions and concerns were answered at the time of the visit. She is in agreement with the above tx plan. BW done today.

## 2025-02-03 NOTE — HISTORY OF PRESENT ILLNESS
[FreeTextEntry1] : f/u [de-identified] : 65 F w/ PMHx of white coat w/ HTN, GERD, recurrent SIBO, caregiver stress, asthma, IBS, allergies, vertigo/tinnitis, migraines, lumbosacral radiculitis and sleeping issues here for follow up. Pt is in pain daily with her back. She recently had several shots and epidurals and this has not helped. She has not tried cymbalta (she is afraid of weight gain), refuses to try gabapentin or lyrica. Not interested in surgery.  Hx colitis and gallstones. Not on abx. IBS is worsened by stress. Hx of recurrent SIBO and heartburn. She has been losing weight which she attributes to not eating enough due to pain. Pt does eat breakfast, lunch and meal replacement shakes. She had BW done today at her job. Pt notes regular BMs and no fevers.  Her vertigo is asymptomatic at this time. She continues to note ringing in her right ear.  Headaches are currently controlled. Pt uses lidocaine patches and CBD cream. She also uses CBD gummies and this helps her sleep. She has not tried botox for migraines and will call to find out about this.  HTN controlled on meds--did remote monitoring. UTD cardio. Monitors this at home. Asthma/allergies controlled on meds. Sees Dr Cope and he wants some BW done today.  Tremors that improve with alcoholic drinks. Worse at work when she is nervous and worse when she is in a lot of pain towards the end of the day.  Rash and dx did bx that showed tinea. Seeing derm on thursday.  Cyclothymia and adjustment disorder--Dr Delgadillo retired so now seeing BH at . Dr Cam no longer takes insurance so she needs to find someone who takes insurance. Sees Dr Arenas who gives her xanax (she pays out of pocket). She is on prozac 30mg but feels like it is doing nothing. Wants to try effexor---has never tried cymbalta.  The patient denies any current fevers, chills, cold symptoms, blurry vision, chest pain, palpitations, shortness of breath, dyspnea on exertion, cough, abdominal pain, urinary symptoms or any vomiting/diarrhea/constipation.

## 2025-02-03 NOTE — REVIEW OF SYSTEMS
[Heartburn] : heartburn [Joint Pain] : joint pain [Back Pain] : back pain [Headache] : headache [Insomnia] : insomnia [Anxiety] : anxiety [Negative] : Heme/Lymph [FreeTextEntry3] : IOLs [FreeTextEntry5] : HTN [FreeTextEntry6] : asthma [FreeTextEntry7] : Colitis, gallstones [de-identified] : migraines

## 2025-02-03 NOTE — PHYSICAL EXAM
[No Acute Distress] : no acute distress [Well Nourished] : well nourished [Well Developed] : well developed [Well-Appearing] : well-appearing [Normal Sclera/Conjunctiva] : normal sclera/conjunctiva [PERRL] : pupils equal round and reactive to light [EOMI] : extraocular movements intact [Normal Outer Ear/Nose] : the outer ears and nose were normal in appearance [Normal Oropharynx] : the oropharynx was normal [No JVD] : no jugular venous distention [No Lymphadenopathy] : no lymphadenopathy [Supple] : supple [No Respiratory Distress] : no respiratory distress  [No Accessory Muscle Use] : no accessory muscle use [Clear to Auscultation] : lungs were clear to auscultation bilaterally [Normal Rate] : normal rate  [Regular Rhythm] : with a regular rhythm [No Carotid Bruits] : no carotid bruits [Pedal Pulses Present] : the pedal pulses are present [No Edema] : there was no peripheral edema [Soft] : abdomen soft [Non Tender] : non-tender [Non-distended] : non-distended [Normal Bowel Sounds] : normal bowel sounds [Normal Posterior Cervical Nodes] : no posterior cervical lymphadenopathy [Normal Anterior Cervical Nodes] : no anterior cervical lymphadenopathy [No CVA Tenderness] : no CVA  tenderness [No Spinal Tenderness] : no spinal tenderness [No Joint Swelling] : no joint swelling [Grossly Normal Strength/Tone] : grossly normal strength/tone [No Rash] : no rash [Coordination Grossly Intact] : coordination grossly intact [No Focal Deficits] : no focal deficits [Normal Gait] : normal gait [Deep Tendon Reflexes (DTR)] : deep tendon reflexes were 2+ and symmetric [Normal Affect] : the affect was normal [Normal Insight/Judgement] : insight and judgment were intact

## 2025-02-04 NOTE — END OF VISIT
[FreeTextEntry3] : I, Dr. Steele personally performed the evaluation and management (E/M) services , including all procedures, for this established patient who presents today with (a) new problem(s)/exacerbation of (an) existing condition(s). That E/M includes conducting the clinically appropriate interval history &/or exam, assessing all new/exacerbated conditions, and establishing a new plan of care. Today, my ELEANOR, Estelita Hampton, was here to observe &/or participate in the visit & follow plan of care established by me.

## 2025-02-04 NOTE — PHYSICAL EXAM
[Midline] : trachea located in midline position [Normal] : no rashes [de-identified] : cerumen in the ear canals; once removed normal EACs

## 2025-02-04 NOTE — HISTORY OF PRESENT ILLNESS
[de-identified] : Patient come sin with ear clogging and needs an ear cleaning. She does use hydrogen peroxide on occasion. She has a baseline tinnitus in the right ear, unchanged. No issues with pain or pressure.  She does not have any nasal congestion or runny nose

## 2025-02-26 NOTE — HISTORY OF PRESENT ILLNESS
[FreeTextEntry1] : Pt is 64 yo woman who has seen Dr. Rosas.  Had been treated by neurologist with topiramate for extended period.  Had bene under stress more recently with son's tbi and put on fluoxetine which more recently she has discontinued. has had visual issues and back problems- which has been repeatedly injured. She has moderate use of naproxen prn (about 1 q3-4 days.) Uses prn sumatriptan tablets and needles and uses Aimovig 1/ month. Had been on Aimovig for about 6 months.  1st at lower dose and then at higher dose. DOes note she is on workers comp and had injured back and had fallen again shortly after nerve ablation. 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  Trying to get healthy to return to work.  Currently experiencing odd sensation.

## 2025-02-26 NOTE — PHYSICAL EXAM
[General Appearance - Alert] : alert [General Appearance - Well Nourished] : well nourished [Oriented To Time, Place, And Person] : oriented to person, place, and time [Impaired Insight] : insight and judgment were intact [Affect] : the affect was normal [Memory Recent] : recent memory was not impaired [Memory Remote] : remote memory was not impaired [Person] : oriented to person [Place] : oriented to place [Time] : oriented to time [Short Term Intact] : short term memory intact [Remote Intact] : remote memory intact [Registration Intact] : recent registration memory intact [Visual Intact] : visual attention was ~T not ~L decreased [Fluency] : fluency intact [Comprehension] : comprehension intact [Reading] : reading intact [Current Events] : adequate knowledge of current events [Past History] : adequate knowledge of personal past history [Vocabulary] : adequate range of vocabulary [Cranial Nerves Oculomotor (III)] : extraocular motion intact [Cranial Nerves Facial (VII)] : face symmetrical [Cranial Nerves Vestibulocochlear (VIII)] : hearing was intact bilaterally [Cranial Nerves Accessory (XI - Cranial And Spinal)] : head turning and shoulder shrug symmetric [Cranial Nerves Hypoglossal (XII)] : there was no tongue deviation with protrusion [No Muscle Atrophy] : normal bulk in all four extremities [Sensation Tactile Decrease] : light touch was intact [Abnormal Walk] : normal gait [Sclera] : the sclera and conjunctiva were normal [No KULDEEP] : no internuclear ophthalmoplegia [Strabismus] : no strabismus was seen [Hearing Threshold Finger Rub Not [place]] : hearing was normal [Neck Appearance] : the appearance of the neck was normal [Neck Cervical Mass (___cm)] : no neck mass was observed [Exaggerated Use Of Accessory Muscles For Inspiration] : no accessory muscle use [Nail Clubbing] : no clubbing  or cyanosis of the fingernails [Involuntary Movements] : no involuntary movements were seen [Skin Color & Pigmentation] : normal skin color and pigmentation [] : no rash [Motor Strength Upper Extremities Bilaterally] : strength was normal in both upper extremities [Tremor] : no tremor present [Dysdiadochokinesia Bilaterally] : not present

## 2025-07-29 NOTE — ASSESSMENT
[FreeTextEntry1] : #Tinea pedis  chronic, recurrent, flaring prior fungal cx grew rare trychophyton  discussed PO terbinafine vs topical terbinafine PLAN - fungal swab again today - start PO terbinafine 250 mg daily x 2 weeks - med interactions checked, side effects discussed such as dysgeusia, labs reviewed - zeasorb powder to footwear - discussed favor that her reaction to sand is a result of an irritant reaction to already inflamed skin  RTC 4-6 weeks or PRN culture results

## 2025-07-29 NOTE — PHYSICAL EXAM
[FreeTextEntry3] : - scale in a moccasin distribution on bilateral feet with interdigital maceration of L feet

## 2025-07-29 NOTE — HISTORY OF PRESENT ILLNESS
[FreeTextEntry1] : rash  [de-identified] :  MARTIN REAGAN is a 66 year old female who presents for eval of:  1. evaluation of rash on feet, itchy, no improvement with TAC 0.1%. fungal cx grew rare trichophyton, prescribed keto cream and pt has been using nonstop, says it cleared over winter, now flaring again; feels like her feet peel more in the sand within 10 minutes of going to the beach - itchy right now